# Patient Record
Sex: MALE | Race: WHITE
[De-identification: names, ages, dates, MRNs, and addresses within clinical notes are randomized per-mention and may not be internally consistent; named-entity substitution may affect disease eponyms.]

---

## 2020-10-02 ENCOUNTER — HOSPITAL ENCOUNTER (EMERGENCY)
Dept: HOSPITAL 92 - ERS | Age: 60
Discharge: ANOTHER HOSPITAL | End: 2020-10-02
Payer: COMMERCIAL

## 2020-10-02 DIAGNOSIS — R06.02: ICD-10-CM

## 2020-10-02 DIAGNOSIS — R18.8: Primary | ICD-10-CM

## 2020-10-02 DIAGNOSIS — E11.9: ICD-10-CM

## 2020-10-02 LAB
ALBUMIN SERPL BCG-MCNC: 2.2 G/DL (ref 3.4–4.8)
ALP SERPL-CCNC: 107 U/L (ref 40–110)
ALT SERPL W P-5'-P-CCNC: 76 U/L (ref 8–55)
ANION GAP SERPL CALC-SCNC: 12 MMOL/L (ref 10–20)
ANISOCYTOSIS BLD QL SMEAR: (no result) (100X)
AST SERPL-CCNC: 69 U/L (ref 5–34)
BASOPHILS # BLD AUTO: 0 THOU/UL (ref 0–0.2)
BASOPHILS NFR BLD AUTO: 0.4 % (ref 0–1)
BILIRUB SERPL-MCNC: 0.4 MG/DL (ref 0.2–1.2)
BUN SERPL-MCNC: 24 MG/DL (ref 8.4–25.7)
CALCIUM SERPL-MCNC: 6.9 MG/DL (ref 7.8–10.44)
CHLORIDE SERPL-SCNC: 109 MMOL/L (ref 98–107)
CK SERPL-CCNC: 485 U/L (ref 30–200)
CO2 SERPL-SCNC: 17 MMOL/L (ref 23–31)
CREAT CL PREDICTED SERPL C-G-VRATE: 0 ML/MIN (ref 70–130)
EOSINOPHIL # BLD AUTO: 0.1 THOU/UL (ref 0–0.7)
EOSINOPHIL NFR BLD AUTO: 3.7 % (ref 0–10)
GLOBULIN SER CALC-MCNC: 2.5 G/DL (ref 2.4–3.5)
GLUCOSE SERPL-MCNC: 168 MG/DL (ref 80–115)
HGB BLD-MCNC: 8.4 G/DL (ref 14–18)
LIPASE SERPL-CCNC: 33 U/L (ref 8–78)
LYMPHOCYTES # BLD: 0.3 THOU/UL (ref 1.2–3.4)
LYMPHOCYTES NFR BLD AUTO: 8.9 % (ref 21–51)
MCH RBC QN AUTO: 28.7 PG (ref 27–31)
MCV RBC AUTO: 89.9 FL (ref 78–98)
MDIFF COMPLETE?: YES
MONOCYTES # BLD AUTO: 0.3 THOU/UL (ref 0.11–0.59)
MONOCYTES NFR BLD AUTO: 7 % (ref 0–10)
NEUTROPHILS # BLD AUTO: 3.1 THOU/UL (ref 1.4–6.5)
NEUTROPHILS NFR BLD AUTO: 80.1 % (ref 42–75)
PLATELET # BLD AUTO: 161 THOU/UL (ref 130–400)
POTASSIUM SERPL-SCNC: 3.5 MMOL/L (ref 3.5–5.1)
RBC # BLD AUTO: 2.93 MILL/UL (ref 4.7–6.1)
SODIUM SERPL-SCNC: 134 MMOL/L (ref 136–145)
WBC # BLD AUTO: 3.9 THOU/UL (ref 4.8–10.8)

## 2020-10-02 PROCEDURE — 84484 ASSAY OF TROPONIN QUANT: CPT

## 2020-10-02 PROCEDURE — 93005 ELECTROCARDIOGRAM TRACING: CPT

## 2020-10-02 PROCEDURE — 85025 COMPLETE CBC W/AUTO DIFF WBC: CPT

## 2020-10-02 PROCEDURE — P9047 ALBUMIN (HUMAN), 25%, 50ML: HCPCS

## 2020-10-02 PROCEDURE — 36415 COLL VENOUS BLD VENIPUNCTURE: CPT

## 2020-10-02 PROCEDURE — 80053 COMPREHEN METABOLIC PANEL: CPT

## 2020-10-02 PROCEDURE — 71045 X-RAY EXAM CHEST 1 VIEW: CPT

## 2020-10-02 PROCEDURE — 83880 ASSAY OF NATRIURETIC PEPTIDE: CPT

## 2020-10-02 PROCEDURE — 82550 ASSAY OF CK (CPK): CPT

## 2020-10-02 PROCEDURE — 83690 ASSAY OF LIPASE: CPT

## 2020-10-02 PROCEDURE — 74177 CT ABD & PELVIS W/CONTRAST: CPT

## 2020-10-02 NOTE — RAD
XR Chest 1 View Portable



HISTORY: Difficulty breathing



COMPARISON: None



FINDINGS: The heart size is normal. The lungs are well expanded without focal areas of consolidation,
 pneumothorax or pleural effusions.



IMPRESSION: No radiographic evidence of acute cardiopulmonary process.



Reported By: Shan Berger 

Electronically Signed:  10/2/2020 10:25 AM

## 2020-10-02 NOTE — CT
CT Abdomen Pelvis W Con: 10/2/2020 11:05 AM



CLINICAL INFORMATION: Abdominal pain and distention



COMPARISON: None.



TECHNIQUE: 

Multiple contiguous axial images were obtained and a CT of the abdomen and pelvis with IV contrast. C
oronal and sagittal reformats were performed.



FINDINGS:



Lower Chest: within normal limits.



Abdomen:

Liver: Diffuse fatty infiltration.

Bile Ducts: Normal caliber.

Gallbladder: No calcified gallstones. Normal caliber wall.

Pancreas: within normal limits.

Spleen: within normal limits.

Adrenals: within normal limits.

Kidneys: within normal limits.



Pelvis:

Reproductive Organs: No pelvic masses.

Ureters: within normal limits.

Bladder: within normal limits.



Peritoneum: Moderate diffuse ascites is seen. No free air is seen.

Bowel: Evaluation of the bowel is difficult given the ascites and significant amount of fluid within 
the bowel loops. There is distention of small bowel loops up to 3.0 cm in size without clear

transition point. There is a segment of bowel in the left abdomen where an anastomotic staple line is
 present containing a large amount of air and debris that likely may represent ingested material or

stool. This measures 7.3 cm in greatest dimension. It is difficult to connect this to the colon and a
ppears this may represent a redundant stomach.

Mesentery and Retroperitoneum: No enlarged mesenteric or retroperitoneal lymph nodes.

Vessels: Normal. 

Abdominal Wall: There is a 4.0 cm left inguinal hernia containing nonobstructed left colon. There may
 be a ventral hernia with a very thin abdominal wall just above the hernia. There is irregularity

of the abdominal wall in this location and a fistula or ostomy through the hernia to the skin cannot 
be excluded.

Bones: Within normal limits  



IMPRESSION:





1. There is an enlarged enteric structure in the left abdomen. This could represent an enlarged segme
nt of large bowel, small bowel, or this could be the stomach. Correlate with history of prior

surgery because this structure is where an anastomotic staple line is present. If more definitive loc
ation of this abnormality within a segment of bowel is desired, then oral contrast should be

administered for better characterization of the bowel.

2. Left inguinal hernia containing nonobstructed colon

3. Ascites

4. Fatty liver







Reported By: Jaiden Bhagat 

Electronically Signed:  10/2/2020 11:50 AM